# Patient Record
Sex: FEMALE | Race: WHITE | NOT HISPANIC OR LATINO | URBAN - METROPOLITAN AREA
[De-identification: names, ages, dates, MRNs, and addresses within clinical notes are randomized per-mention and may not be internally consistent; named-entity substitution may affect disease eponyms.]

---

## 2017-09-01 ENCOUNTER — EMERGENCY (EMERGENCY)
Facility: HOSPITAL | Age: 52
LOS: 1 days | Discharge: HOME | End: 2017-09-01
Attending: EMERGENCY MEDICINE
Payer: SELF-PAY

## 2017-09-01 VITALS
TEMPERATURE: 98 F | HEART RATE: 79 BPM | SYSTOLIC BLOOD PRESSURE: 129 MMHG | OXYGEN SATURATION: 100 % | RESPIRATION RATE: 20 BRPM | WEIGHT: 240.08 LBS | HEIGHT: 60 IN | DIASTOLIC BLOOD PRESSURE: 84 MMHG

## 2017-09-01 DIAGNOSIS — Z98.84 BARIATRIC SURGERY STATUS: Chronic | ICD-10-CM

## 2017-09-01 DIAGNOSIS — Z87.09 PERSONAL HISTORY OF OTHER DISEASES OF THE RESPIRATORY SYSTEM: Chronic | ICD-10-CM

## 2017-09-01 PROCEDURE — 99284 EMERGENCY DEPT VISIT MOD MDM: CPT

## 2017-09-01 PROCEDURE — 93971 EXTREMITY STUDY: CPT

## 2017-09-01 PROCEDURE — 93971 EXTREMITY STUDY: CPT | Mod: 26,LT

## 2017-09-01 PROCEDURE — 99284 EMERGENCY DEPT VISIT MOD MDM: CPT | Mod: 25

## 2017-09-01 NOTE — ED ADULT NURSE NOTE - OBJECTIVE STATEMENT
Patient arrived to ED today with c/o left lower leg pain behind her knee.  Patient states she has had the pain for the past month and has gotten worse since with pain radiating down her left leg and numbness to her foot at times.  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  Patient denies chest pain, SOB, fever, dizziness, headache, n/v.

## 2017-09-01 NOTE — ED STATDOCS - OBJECTIVE STATEMENT
51 y/o F pt with hx of HTN presents to ED c/o intermittent left calf  and inner thigh pain associated with numbness and tingling for  1 month. Over the past couple of days symptoms have worsened causing her to have difficulty walking. She states symptoms started when she stretched during sexual intercourse. No family hx of DVT or PE. No SOB. No further complaints at this time 51 y/o F pt with hx of HTN presents to ED c/o intermittent left calf  and inner thigh pain associated with numbness and tingling for 1 month. Over the past couple of days symptoms have worsened causing her to have difficulty walking. She states symptoms started when she stretched during sexual intercourse. No family hx of DVT or PE. No SOB. No further complaints at this time

## 2017-09-01 NOTE — ED STATDOCS - ATTENDING CONTRIBUTION TO CARE
I, Raymon Johnson, performed the initial face to face bedside interview with this patient regarding history of present illness, review of symptoms and relevant past medical, social and family history.  I completed an independent physical examination.  I was the provider who initially evaluated this patient.  The history, relevant review of systems, past medical and surgical history, medical decision making, and physical examination was documented by the scribe in my presence and I attest to the accuracy of the documentation. Follow-up on ordered tests (ie labs, radiologic studies) and re-evaluation of the patient's status has been communicated to the ACP.  Disposition of the patient will be based on test outcome and response to ED interventions.

## 2017-09-01 NOTE — ED STATDOCS - PROGRESS NOTE DETAILS
51 yo F PT complaining of L thigh/ calf pain/ tingling x 1 month. Seen by intake Physician, agree with H&P, orders, and plans. PT states she was having intercourse and experienced a sharp pain in her left distal inner thigh after her leg was abducted. PT admits to tingling of 2nd - 4th lower digits. PT denies fever, SOB, CP, HA, visual changes, LOC. PE: Heart: S1s2 rrr Lungs: CTA B/L Extremities: LLE tender to palpation of distal inner thigh + varicosities FROM Sensory grossly intact. A/P: r/o DVT, will follow pending ultrasound. 51 yo F PT complaining of L thigh/ calf pain/ tingling x 1 month. Seen by intake Physician, agree with H&P, orders, and plans. PT states she was having intercourse and experienced a sharp pain in her left distal inner thigh after her leg was abducted. PT admits to tingling of 2nd - 4th lower digits. PT denies fever, SOB, CP, HA, visual changes, LOC. PE: Heart: S1s2 rrr Lungs: CTA B/L Extremities: LLE tender to palpation of distal inner thigh + varicosities DP intact FROM Sensory grossly intact. A/P: r/o DVT, will follow pending ultrasound. ultrasound shows no signs of DVT in LLE.

## 2017-09-01 NOTE — ED ADULT NURSE NOTE - PSH
H/O tonsillitis  tonsillectomy  History of removal of laparoscopic gastric banding device  placed 2002 removed 2005

## 2017-11-04 ENCOUNTER — EMERGENCY (EMERGENCY)
Facility: HOSPITAL | Age: 52
LOS: 1 days | Discharge: HOME | End: 2017-11-04
Attending: EMERGENCY MEDICINE | Admitting: EMERGENCY MEDICINE
Payer: SELF-PAY

## 2017-11-04 VITALS
OXYGEN SATURATION: 99 % | TEMPERATURE: 99 F | RESPIRATION RATE: 18 BRPM | HEART RATE: 91 BPM | HEIGHT: 60 IN | SYSTOLIC BLOOD PRESSURE: 147 MMHG | WEIGHT: 222.01 LBS | DIASTOLIC BLOOD PRESSURE: 85 MMHG

## 2017-11-04 DIAGNOSIS — Z87.09 PERSONAL HISTORY OF OTHER DISEASES OF THE RESPIRATORY SYSTEM: Chronic | ICD-10-CM

## 2017-11-04 DIAGNOSIS — Z98.84 BARIATRIC SURGERY STATUS: Chronic | ICD-10-CM

## 2017-11-04 PROCEDURE — 12001 RPR S/N/AX/GEN/TRNK 2.5CM/<: CPT | Mod: RT

## 2017-11-04 PROCEDURE — 99283 EMERGENCY DEPT VISIT LOW MDM: CPT | Mod: 25

## 2017-11-04 PROCEDURE — 90715 TDAP VACCINE 7 YRS/> IM: CPT

## 2017-11-04 PROCEDURE — 90471 IMMUNIZATION ADMIN: CPT

## 2017-11-04 PROCEDURE — 12001 RPR S/N/AX/GEN/TRNK 2.5CM/<: CPT

## 2017-11-04 RX ORDER — TETANUS TOXOID, REDUCED DIPHTHERIA TOXOID AND ACELLULAR PERTUSSIS VACCINE, ADSORBED 5; 2.5; 8; 8; 2.5 [IU]/.5ML; [IU]/.5ML; UG/.5ML; UG/.5ML; UG/.5ML
0.5 SUSPENSION INTRAMUSCULAR ONCE
Qty: 0 | Refills: 0 | Status: COMPLETED | OUTPATIENT
Start: 2017-11-04 | End: 2017-11-04

## 2017-11-04 RX ADMIN — TETANUS TOXOID, REDUCED DIPHTHERIA TOXOID AND ACELLULAR PERTUSSIS VACCINE, ADSORBED 0.5 MILLILITER(S): 5; 2.5; 8; 8; 2.5 SUSPENSION INTRAMUSCULAR at 18:51

## 2017-11-04 NOTE — ED STATDOCS - OBJECTIVE STATEMENT
53 yo F, with hx of HTN, presents to ED c/o laceration to right webspace in between 4th and 5th digits while cooking today at 1200. Denies any other injuries. Last Td was 4 years ago. Denies blood thinners. Current meds include ASA 81mg, losartan, and HCTZ. No further complaints at this time.

## 2017-11-04 NOTE — ED STATDOCS - ATTENDING CONTRIBUTION TO CARE
I, Sonam Wright, performed the initial face to face bedside interview with this patient regarding history of present illness, review of symptoms and relevant past medical, social and family history.  I completed an independent physical examination.  I was the initial provider who evaluated this patient. I have signed out the follow up of any pending tests (i.e. labs, radiological studies) to the ACP.  I have communicated the patient’s plan of care and disposition with the ACP.

## 2018-06-29 ENCOUNTER — INPATIENT (INPATIENT)
Facility: HOSPITAL | Age: 53
LOS: 9 days | Discharge: HOME | End: 2018-07-09
Attending: PSYCHIATRY & NEUROLOGY | Admitting: PSYCHIATRY & NEUROLOGY
Payer: COMMERCIAL

## 2018-06-29 VITALS — TEMPERATURE: 99 F | RESPIRATION RATE: 19 BRPM

## 2018-06-29 DIAGNOSIS — Z87.09 PERSONAL HISTORY OF OTHER DISEASES OF THE RESPIRATORY SYSTEM: Chronic | ICD-10-CM

## 2018-06-29 DIAGNOSIS — Z98.84 BARIATRIC SURGERY STATUS: Chronic | ICD-10-CM

## 2018-06-29 DIAGNOSIS — F32.9 MAJOR DEPRESSIVE DISORDER, SINGLE EPISODE, UNSPECIFIED: ICD-10-CM

## 2018-06-29 PROCEDURE — 99223 1ST HOSP IP/OBS HIGH 75: CPT

## 2018-06-29 RX ORDER — IBUPROFEN 200 MG
400 TABLET ORAL THREE TIMES A DAY
Qty: 0 | Refills: 0 | Status: DISCONTINUED | OUTPATIENT
Start: 2018-06-29 | End: 2018-07-09

## 2018-06-29 RX ORDER — LOPERAMIDE HCL 2 MG
2 TABLET ORAL THREE TIMES A DAY
Qty: 0 | Refills: 0 | Status: DISCONTINUED | OUTPATIENT
Start: 2018-06-29 | End: 2018-07-09

## 2018-06-29 RX ORDER — TRAZODONE HCL 50 MG
50 TABLET ORAL AT BEDTIME
Qty: 0 | Refills: 0 | Status: DISCONTINUED | OUTPATIENT
Start: 2018-06-29 | End: 2018-07-03

## 2018-06-29 RX ORDER — VENLAFAXINE HCL 75 MG
37.5 CAPSULE, EXT RELEASE 24 HR ORAL DAILY
Qty: 0 | Refills: 0 | Status: DISCONTINUED | OUTPATIENT
Start: 2018-06-30 | End: 2018-07-01

## 2018-06-29 RX ORDER — PANTOPRAZOLE SODIUM 20 MG/1
40 TABLET, DELAYED RELEASE ORAL
Qty: 0 | Refills: 0 | Status: DISCONTINUED | OUTPATIENT
Start: 2018-06-29 | End: 2018-07-09

## 2018-06-29 RX ORDER — LOSARTAN POTASSIUM 100 MG/1
50 TABLET, FILM COATED ORAL DAILY
Qty: 0 | Refills: 0 | Status: DISCONTINUED | OUTPATIENT
Start: 2018-06-29 | End: 2018-07-09

## 2018-06-29 RX ORDER — ASPIRIN/CALCIUM CARB/MAGNESIUM 324 MG
81 TABLET ORAL
Qty: 0 | Refills: 0 | Status: DISCONTINUED | OUTPATIENT
Start: 2018-06-29 | End: 2018-07-09

## 2018-06-29 RX ORDER — FERROUS SULFATE 325(65) MG
325 TABLET ORAL DAILY
Qty: 0 | Refills: 0 | Status: DISCONTINUED | OUTPATIENT
Start: 2018-06-29 | End: 2018-07-09

## 2018-06-29 RX ADMIN — Medication 400 MILLIGRAM(S): at 23:07

## 2018-06-29 RX ADMIN — Medication 50 MILLIGRAM(S): at 23:07

## 2018-06-29 RX ADMIN — Medication 400 MILLIGRAM(S): at 23:30

## 2018-06-30 DIAGNOSIS — G89.29 OTHER CHRONIC PAIN: ICD-10-CM

## 2018-06-30 DIAGNOSIS — F32.9 MAJOR DEPRESSIVE DISORDER, SINGLE EPISODE, UNSPECIFIED: ICD-10-CM

## 2018-06-30 DIAGNOSIS — D64.9 ANEMIA, UNSPECIFIED: ICD-10-CM

## 2018-06-30 DIAGNOSIS — I10 ESSENTIAL (PRIMARY) HYPERTENSION: ICD-10-CM

## 2018-06-30 LAB
BASOPHILS # BLD AUTO: 0.01 K/UL — SIGNIFICANT CHANGE UP (ref 0–0.2)
BASOPHILS NFR BLD AUTO: 0.1 % — SIGNIFICANT CHANGE UP (ref 0–2)
EOSINOPHIL # BLD AUTO: 0.18 K/UL — SIGNIFICANT CHANGE UP (ref 0–0.5)
EOSINOPHIL NFR BLD AUTO: 2.6 % — SIGNIFICANT CHANGE UP (ref 0–6)
HCG SERPL-ACNC: < 5 MIU/ML — SIGNIFICANT CHANGE UP
HCT VFR BLD CALC: 35.7 % — SIGNIFICANT CHANGE UP (ref 34.5–45)
HGB BLD-MCNC: 11.2 G/DL — LOW (ref 11.5–15.5)
IMM GRANULOCYTES # BLD AUTO: 0.02 # — SIGNIFICANT CHANGE UP
IMM GRANULOCYTES NFR BLD AUTO: 0.3 % — SIGNIFICANT CHANGE UP (ref 0–1.5)
LYMPHOCYTES # BLD AUTO: 1.31 K/UL — SIGNIFICANT CHANGE UP (ref 1–3.3)
LYMPHOCYTES # BLD AUTO: 18.6 % — SIGNIFICANT CHANGE UP (ref 13–44)
MAGNESIUM SERPL-MCNC: 2 MG/DL — SIGNIFICANT CHANGE UP (ref 1.6–2.6)
MCHC RBC-ENTMCNC: 25.6 PG — LOW (ref 27–34)
MCHC RBC-ENTMCNC: 31.4 % — LOW (ref 32–36)
MCV RBC AUTO: 81.7 FL — SIGNIFICANT CHANGE UP (ref 80–100)
MONOCYTES # BLD AUTO: 0.48 K/UL — SIGNIFICANT CHANGE UP (ref 0–0.9)
MONOCYTES NFR BLD AUTO: 6.8 % — SIGNIFICANT CHANGE UP (ref 2–14)
NEUTROPHILS # BLD AUTO: 5.04 K/UL — SIGNIFICANT CHANGE UP (ref 1.8–7.4)
NEUTROPHILS NFR BLD AUTO: 71.6 % — SIGNIFICANT CHANGE UP (ref 43–77)
NRBC # FLD: 0 — SIGNIFICANT CHANGE UP
PLATELET # BLD AUTO: 289 K/UL — SIGNIFICANT CHANGE UP (ref 150–400)
PMV BLD: 10 FL — SIGNIFICANT CHANGE UP (ref 7–13)
RBC # BLD: 4.37 M/UL — SIGNIFICANT CHANGE UP (ref 3.8–5.2)
RBC # FLD: 17.5 % — HIGH (ref 10.3–14.5)
TSH SERPL-MCNC: 1.37 UIU/ML — SIGNIFICANT CHANGE UP (ref 0.27–4.2)
WBC # BLD: 7.04 K/UL — SIGNIFICANT CHANGE UP (ref 3.8–10.5)
WBC # FLD AUTO: 7.04 K/UL — SIGNIFICANT CHANGE UP (ref 3.8–10.5)

## 2018-06-30 PROCEDURE — 99232 SBSQ HOSP IP/OBS MODERATE 35: CPT

## 2018-06-30 PROCEDURE — 99223 1ST HOSP IP/OBS HIGH 75: CPT

## 2018-06-30 RX ORDER — SACCHAROMYCES BOULARDII 250 MG
250 POWDER IN PACKET (EA) ORAL
Qty: 0 | Refills: 0 | Status: DISCONTINUED | OUTPATIENT
Start: 2018-06-30 | End: 2018-07-09

## 2018-06-30 RX ADMIN — LOSARTAN POTASSIUM 50 MILLIGRAM(S): 100 TABLET, FILM COATED ORAL at 08:44

## 2018-06-30 RX ADMIN — Medication 325 MILLIGRAM(S): at 08:44

## 2018-06-30 RX ADMIN — Medication 81 MILLIGRAM(S): at 20:44

## 2018-06-30 RX ADMIN — Medication 400 MILLIGRAM(S): at 23:30

## 2018-06-30 RX ADMIN — PANTOPRAZOLE SODIUM 40 MILLIGRAM(S): 20 TABLET, DELAYED RELEASE ORAL at 08:05

## 2018-06-30 RX ADMIN — Medication 81 MILLIGRAM(S): at 08:44

## 2018-06-30 RX ADMIN — Medication 400 MILLIGRAM(S): at 09:20

## 2018-06-30 RX ADMIN — Medication 400 MILLIGRAM(S): at 22:00

## 2018-06-30 RX ADMIN — Medication 250 MILLIGRAM(S): at 20:44

## 2018-06-30 RX ADMIN — Medication 50 MILLIGRAM(S): at 22:00

## 2018-06-30 RX ADMIN — Medication 37.5 MILLIGRAM(S): at 08:44

## 2018-06-30 RX ADMIN — Medication 400 MILLIGRAM(S): at 08:50

## 2018-06-30 NOTE — CONSULT NOTE ADULT - ASSESSMENT
52F hx of HTN, Chronic Leg/Back pain, and Depression admitted to [facility] for suicidal ideation. Medicine consulted for co-management.

## 2018-06-30 NOTE — CONSULT NOTE ADULT - SUBJECTIVE AND OBJECTIVE BOX
HPI: 52F hx of HTN, anemia, Depression, admitted to [facility] for suicidal ideation. Medicine consulted for co-management. Patient seen at bedside, she feels her mood has improved now, still with chronic back and leg pain issues. No fevers, chills, chest pains, cough, dysuria, nausea, vomiting, diarrhea. She says she has been under a lot of pressure lately due to her parents being ill, her children, and financial issues. Because of this she self-discontinued her depression medications.     PAST MEDICAL & SURGICAL HISTORY:  HTN (hypertension)  ADHD  Low calcium levels  Anemia  H/O tonsillitis: tonsillectomy  History of removal of laparoscopic gastric banding device: placed 2002 removed 2005      Review of Systems:   CONSTITUTIONAL: No fever, weight loss, or fatigue  EYES: No eye pain, visual disturbances, or discharge  ENMT:  No difficulty hearing, tinnitus, vertigo; No sinus or throat pain  NECK: No pain or stiffness  RESPIRATORY: No cough, wheezing, chills or hemoptysis; No shortness of breath  CARDIOVASCULAR: No chest pain, palpitations, dizziness, or leg swelling  GASTROINTESTINAL: No abdominal or epigastric pain. No nausea, vomiting, or hematemesis; No diarrhea or constipation. No melena or hematochezia.  GENITOURINARY: No dysuria, frequency, hematuria, or incontinence  NEUROLOGICAL: No headaches, memory loss, loss of strength, numbness, or tremors  SKIN: No itching, burning, rashes, or lesions   LYMPH NODES: No enlarged glands  ENDOCRINE: No heat or cold intolerance; No hair loss  MUSCULOSKELETAL: See HPI  HEME/LYMPH: No easy bruising, or bleeding gums  ALLERY AND IMMUNOLOGIC: No hives or eczema    Allergies    Demerol HCl (Hives)    Intolerances    ACE inhibitors (Other)      Social History: drinks 2 glasses of alcohol per night, occasional marijuana use, no cigarettes, works as an ambulatory RN in [place]     FAMILY HISTORY: Depression, Suicide       MEDICATIONS  (STANDING):  aspirin enteric coated 81 milliGRAM(s) Oral two times a day  ferrous    sulfate 325 milliGRAM(s) Oral daily  losartan 50 milliGRAM(s) Oral daily  pantoprazole    Tablet 40 milliGRAM(s) Oral before breakfast  venlafaxine XR 37.5 milliGRAM(s) Oral daily    MEDICATIONS  (PRN):  ibuprofen  Tablet 400 milliGRAM(s) Oral three times a day PRN mild to moderate pain  loperamide 2 milliGRAM(s) Oral three times a day PRN Diarrhea  LORazepam     Tablet 0.5 milliGRAM(s) Oral two times a day PRN Anxiety  traZODone 50 milliGRAM(s) Oral at bedtime PRN insomnia      Vital Signs Last 24 Hrs  T(C): 36.8 (30 Jun 2018 07:45), Max: 37.4 (29 Jun 2018 22:05)  T(F): 98.2 (30 Jun 2018 07:45), Max: 99.3 (29 Jun 2018 22:05)  HR: --  BP: 130/91   BP(mean): --  RR: 18 (30 Jun 2018 07:45) (18 - 19)  SpO2: --  CAPILLARY BLOOD GLUCOSE    PHYSICAL EXAM:  GENERAL: NAD, well-developed  HEAD:  Atraumatic, Normocephalic  EYES: EOMI, PERRLA, conjunctiva and sclera clear  NECK: Supple, No JVD  CHEST/LUNG: Clear to auscultation bilaterally; No wheeze  HEART: Regular rate and rhythm; No murmurs, rubs, or gallops  ABDOMEN: Soft, Nontender, Nondistended; Bowel sounds present  EXTREMITIES:  2+ Peripheral Pulses, No clubbing, cyanosis, or edema  PSYCH: AAOx3  NEUROLOGY: non-focal  SKIN: No rashes or lesions    LABS:                        11.2   7.04  )-----------( 289      ( 30 Jun 2018 09:50 )             35.7       Mg     2.0     06-30

## 2018-06-30 NOTE — CONSULT NOTE ADULT - PROBLEM SELECTOR RECOMMENDATION 9
c/w Losartan as previously prescribed. Chart review shows that patient was also on Hctz 12.5 mg PO qD. May add if BP persistently elevated

## 2018-06-30 NOTE — CONSULT NOTE ADULT - PROBLEM SELECTOR RECOMMENDATION 3
Patient requesting Tylenol for moderate pain. Sometimes will take ibuprofen or percocet when her chronic pains which seem like muscular strains in origin given the focality on the calves thighs and lumbar but not necessarily related to each other. C/w tylenol for moderate pain and percocet 1 tab q6h for severe pain

## 2018-07-01 PROCEDURE — 99232 SBSQ HOSP IP/OBS MODERATE 35: CPT

## 2018-07-01 RX ORDER — HYDROCHLOROTHIAZIDE 25 MG
12.5 TABLET ORAL DAILY
Qty: 0 | Refills: 0 | Status: DISCONTINUED | OUTPATIENT
Start: 2018-07-01 | End: 2018-07-09

## 2018-07-01 RX ORDER — VENLAFAXINE HCL 75 MG
75 CAPSULE, EXT RELEASE 24 HR ORAL DAILY
Qty: 0 | Refills: 0 | Status: DISCONTINUED | OUTPATIENT
Start: 2018-07-01 | End: 2018-07-04

## 2018-07-01 RX ADMIN — Medication 250 MILLIGRAM(S): at 08:47

## 2018-07-01 RX ADMIN — Medication 12.5 MILLIGRAM(S): at 15:09

## 2018-07-01 RX ADMIN — Medication 81 MILLIGRAM(S): at 21:24

## 2018-07-01 RX ADMIN — PANTOPRAZOLE SODIUM 40 MILLIGRAM(S): 20 TABLET, DELAYED RELEASE ORAL at 08:47

## 2018-07-01 RX ADMIN — Medication 325 MILLIGRAM(S): at 08:47

## 2018-07-01 RX ADMIN — Medication 250 MILLIGRAM(S): at 21:24

## 2018-07-01 RX ADMIN — Medication 81 MILLIGRAM(S): at 08:47

## 2018-07-01 RX ADMIN — Medication 50 MILLIGRAM(S): at 22:45

## 2018-07-01 RX ADMIN — LOSARTAN POTASSIUM 50 MILLIGRAM(S): 100 TABLET, FILM COATED ORAL at 08:47

## 2018-07-01 RX ADMIN — Medication 37.5 MILLIGRAM(S): at 08:47

## 2018-07-01 RX ADMIN — Medication 400 MILLIGRAM(S): at 22:20

## 2018-07-01 RX ADMIN — Medication 400 MILLIGRAM(S): at 21:20

## 2018-07-02 RX ADMIN — Medication 81 MILLIGRAM(S): at 21:05

## 2018-07-02 RX ADMIN — Medication 250 MILLIGRAM(S): at 21:05

## 2018-07-02 RX ADMIN — Medication 250 MILLIGRAM(S): at 09:37

## 2018-07-02 RX ADMIN — Medication 12.5 MILLIGRAM(S): at 09:37

## 2018-07-02 RX ADMIN — PANTOPRAZOLE SODIUM 40 MILLIGRAM(S): 20 TABLET, DELAYED RELEASE ORAL at 09:55

## 2018-07-02 RX ADMIN — Medication 75 MILLIGRAM(S): at 09:55

## 2018-07-02 RX ADMIN — Medication 325 MILLIGRAM(S): at 09:37

## 2018-07-02 RX ADMIN — LOSARTAN POTASSIUM 50 MILLIGRAM(S): 100 TABLET, FILM COATED ORAL at 09:37

## 2018-07-02 RX ADMIN — Medication 400 MILLIGRAM(S): at 09:55

## 2018-07-02 RX ADMIN — Medication 81 MILLIGRAM(S): at 09:37

## 2018-07-03 RX ORDER — TRAZODONE HCL 50 MG
100 TABLET ORAL AT BEDTIME
Qty: 0 | Refills: 0 | Status: DISCONTINUED | OUTPATIENT
Start: 2018-07-03 | End: 2018-07-03

## 2018-07-03 RX ORDER — TRAZODONE HCL 50 MG
50 TABLET ORAL ONCE
Qty: 0 | Refills: 0 | Status: COMPLETED | OUTPATIENT
Start: 2018-07-03 | End: 2018-07-04

## 2018-07-03 RX ORDER — TRAZODONE HCL 50 MG
50 TABLET ORAL AT BEDTIME
Qty: 0 | Refills: 0 | Status: DISCONTINUED | OUTPATIENT
Start: 2018-07-03 | End: 2018-07-06

## 2018-07-03 RX ADMIN — Medication 75 MILLIGRAM(S): at 09:19

## 2018-07-03 RX ADMIN — Medication 50 MILLIGRAM(S): at 20:53

## 2018-07-03 RX ADMIN — LOSARTAN POTASSIUM 50 MILLIGRAM(S): 100 TABLET, FILM COATED ORAL at 09:19

## 2018-07-03 RX ADMIN — Medication 325 MILLIGRAM(S): at 09:19

## 2018-07-03 RX ADMIN — Medication 81 MILLIGRAM(S): at 20:53

## 2018-07-03 RX ADMIN — Medication 250 MILLIGRAM(S): at 20:53

## 2018-07-03 RX ADMIN — Medication 12.5 MILLIGRAM(S): at 09:19

## 2018-07-03 RX ADMIN — Medication 400 MILLIGRAM(S): at 20:54

## 2018-07-03 RX ADMIN — PANTOPRAZOLE SODIUM 40 MILLIGRAM(S): 20 TABLET, DELAYED RELEASE ORAL at 09:19

## 2018-07-03 RX ADMIN — Medication 400 MILLIGRAM(S): at 21:54

## 2018-07-03 RX ADMIN — Medication 250 MILLIGRAM(S): at 09:19

## 2018-07-03 RX ADMIN — Medication 81 MILLIGRAM(S): at 09:19

## 2018-07-04 PROCEDURE — 99232 SBSQ HOSP IP/OBS MODERATE 35: CPT

## 2018-07-04 RX ORDER — VENLAFAXINE HCL 75 MG
37.5 CAPSULE, EXT RELEASE 24 HR ORAL ONCE
Qty: 0 | Refills: 0 | Status: COMPLETED | OUTPATIENT
Start: 2018-07-04 | End: 2018-07-04

## 2018-07-04 RX ORDER — VENLAFAXINE HCL 75 MG
112.5 CAPSULE, EXT RELEASE 24 HR ORAL DAILY
Qty: 0 | Refills: 0 | Status: DISCONTINUED | OUTPATIENT
Start: 2018-07-04 | End: 2018-07-04

## 2018-07-04 RX ORDER — VENLAFAXINE HCL 75 MG
112.5 CAPSULE, EXT RELEASE 24 HR ORAL DAILY
Qty: 0 | Refills: 0 | Status: DISCONTINUED | OUTPATIENT
Start: 2018-07-05 | End: 2018-07-06

## 2018-07-04 RX ADMIN — Medication 12.5 MILLIGRAM(S): at 08:34

## 2018-07-04 RX ADMIN — Medication 81 MILLIGRAM(S): at 08:34

## 2018-07-04 RX ADMIN — LOSARTAN POTASSIUM 50 MILLIGRAM(S): 100 TABLET, FILM COATED ORAL at 08:34

## 2018-07-04 RX ADMIN — Medication 250 MILLIGRAM(S): at 08:34

## 2018-07-04 RX ADMIN — Medication 325 MILLIGRAM(S): at 08:34

## 2018-07-04 RX ADMIN — Medication 81 MILLIGRAM(S): at 20:56

## 2018-07-04 RX ADMIN — Medication 37.5 MILLIGRAM(S): at 08:34

## 2018-07-04 RX ADMIN — Medication 250 MILLIGRAM(S): at 20:56

## 2018-07-04 RX ADMIN — Medication 75 MILLIGRAM(S): at 08:14

## 2018-07-04 RX ADMIN — Medication 50 MILLIGRAM(S): at 22:38

## 2018-07-04 RX ADMIN — Medication 50 MILLIGRAM(S): at 20:56

## 2018-07-04 RX ADMIN — PANTOPRAZOLE SODIUM 40 MILLIGRAM(S): 20 TABLET, DELAYED RELEASE ORAL at 08:34

## 2018-07-05 PROCEDURE — 99232 SBSQ HOSP IP/OBS MODERATE 35: CPT

## 2018-07-05 RX ADMIN — Medication 81 MILLIGRAM(S): at 20:42

## 2018-07-05 RX ADMIN — Medication 400 MILLIGRAM(S): at 21:03

## 2018-07-05 RX ADMIN — Medication 81 MILLIGRAM(S): at 08:55

## 2018-07-05 RX ADMIN — Medication 112.5 MILLIGRAM(S): at 08:55

## 2018-07-05 RX ADMIN — LOSARTAN POTASSIUM 50 MILLIGRAM(S): 100 TABLET, FILM COATED ORAL at 08:55

## 2018-07-05 RX ADMIN — Medication 50 MILLIGRAM(S): at 20:42

## 2018-07-05 RX ADMIN — Medication 250 MILLIGRAM(S): at 20:42

## 2018-07-05 RX ADMIN — Medication 250 MILLIGRAM(S): at 08:55

## 2018-07-05 RX ADMIN — Medication 400 MILLIGRAM(S): at 20:00

## 2018-07-05 RX ADMIN — Medication 12.5 MILLIGRAM(S): at 08:55

## 2018-07-05 RX ADMIN — Medication 325 MILLIGRAM(S): at 08:55

## 2018-07-05 RX ADMIN — PANTOPRAZOLE SODIUM 40 MILLIGRAM(S): 20 TABLET, DELAYED RELEASE ORAL at 08:55

## 2018-07-06 LAB
BUN SERPL-MCNC: 13 MG/DL — SIGNIFICANT CHANGE UP (ref 7–23)
CALCIUM SERPL-MCNC: 9.4 MG/DL — SIGNIFICANT CHANGE UP (ref 8.4–10.5)
CHLORIDE SERPL-SCNC: 100 MMOL/L — SIGNIFICANT CHANGE UP (ref 98–107)
CO2 SERPL-SCNC: 28 MMOL/L — SIGNIFICANT CHANGE UP (ref 22–31)
CREAT SERPL-MCNC: 0.91 MG/DL — SIGNIFICANT CHANGE UP (ref 0.5–1.3)
GLUCOSE SERPL-MCNC: 93 MG/DL — SIGNIFICANT CHANGE UP (ref 70–99)
POTASSIUM SERPL-MCNC: 4.4 MMOL/L — SIGNIFICANT CHANGE UP (ref 3.5–5.3)
POTASSIUM SERPL-SCNC: 4.4 MMOL/L — SIGNIFICANT CHANGE UP (ref 3.5–5.3)
SODIUM SERPL-SCNC: 139 MMOL/L — SIGNIFICANT CHANGE UP (ref 135–145)

## 2018-07-06 PROCEDURE — 90853 GROUP PSYCHOTHERAPY: CPT

## 2018-07-06 PROCEDURE — 99232 SBSQ HOSP IP/OBS MODERATE 35: CPT | Mod: 25

## 2018-07-06 RX ORDER — IBUPROFEN 200 MG
1 TABLET ORAL
Qty: 0 | Refills: 0 | COMMUNITY

## 2018-07-06 RX ORDER — TRAZODONE HCL 50 MG
50 TABLET ORAL AT BEDTIME
Qty: 0 | Refills: 0 | Status: DISCONTINUED | OUTPATIENT
Start: 2018-07-06 | End: 2018-07-09

## 2018-07-06 RX ORDER — TRAZODONE HCL 50 MG
1 TABLET ORAL
Qty: 14 | Refills: 0 | OUTPATIENT
Start: 2018-07-06 | End: 2018-07-19

## 2018-07-06 RX ORDER — ASPIRIN/CALCIUM CARB/MAGNESIUM 324 MG
1 TABLET ORAL
Qty: 0 | Refills: 0 | COMMUNITY

## 2018-07-06 RX ORDER — LOSARTAN POTASSIUM 100 MG/1
1 TABLET, FILM COATED ORAL
Qty: 14 | Refills: 0 | OUTPATIENT
Start: 2018-07-06 | End: 2018-07-19

## 2018-07-06 RX ORDER — SACCHAROMYCES BOULARDII 250 MG
1 POWDER IN PACKET (EA) ORAL
Qty: 0 | Refills: 0 | COMMUNITY
Start: 2018-07-06

## 2018-07-06 RX ORDER — FERROUS SULFATE 325(65) MG
0 TABLET ORAL
Qty: 0 | Refills: 0 | COMMUNITY

## 2018-07-06 RX ORDER — LOPERAMIDE HCL 2 MG
1 TABLET ORAL
Qty: 0 | Refills: 0 | COMMUNITY
Start: 2018-07-06

## 2018-07-06 RX ORDER — PANTOPRAZOLE SODIUM 20 MG/1
1 TABLET, DELAYED RELEASE ORAL
Qty: 14 | Refills: 0 | OUTPATIENT
Start: 2018-07-06 | End: 2018-07-19

## 2018-07-06 RX ORDER — DEXTROAMPHETAMINE SACCHARATE, AMPHETAMINE ASPARTATE, DEXTROAMPHETAMINE SULFATE AND AMPHETAMINE SULFATE 1.875; 1.875; 1.875; 1.875 MG/1; MG/1; MG/1; MG/1
0 TABLET ORAL
Qty: 0 | Refills: 0 | COMMUNITY

## 2018-07-06 RX ORDER — VENLAFAXINE HCL 75 MG
150 CAPSULE, EXT RELEASE 24 HR ORAL DAILY
Qty: 0 | Refills: 0 | Status: DISCONTINUED | OUTPATIENT
Start: 2018-07-07 | End: 2018-07-09

## 2018-07-06 RX ORDER — VENLAFAXINE HCL 75 MG
1 CAPSULE, EXT RELEASE 24 HR ORAL
Qty: 14 | Refills: 0 | OUTPATIENT
Start: 2018-07-06 | End: 2018-07-19

## 2018-07-06 RX ADMIN — Medication 325 MILLIGRAM(S): at 09:17

## 2018-07-06 RX ADMIN — Medication 81 MILLIGRAM(S): at 09:17

## 2018-07-06 RX ADMIN — Medication 250 MILLIGRAM(S): at 09:17

## 2018-07-06 RX ADMIN — Medication 400 MILLIGRAM(S): at 04:58

## 2018-07-06 RX ADMIN — Medication 81 MILLIGRAM(S): at 21:27

## 2018-07-06 RX ADMIN — Medication 400 MILLIGRAM(S): at 03:50

## 2018-07-06 RX ADMIN — Medication 250 MILLIGRAM(S): at 21:27

## 2018-07-06 RX ADMIN — Medication 112.5 MILLIGRAM(S): at 09:17

## 2018-07-06 RX ADMIN — Medication 50 MILLIGRAM(S): at 21:27

## 2018-07-06 RX ADMIN — Medication 12.5 MILLIGRAM(S): at 09:17

## 2018-07-06 RX ADMIN — LOSARTAN POTASSIUM 50 MILLIGRAM(S): 100 TABLET, FILM COATED ORAL at 09:17

## 2018-07-07 RX ADMIN — Medication 400 MILLIGRAM(S): at 08:40

## 2018-07-07 RX ADMIN — Medication 250 MILLIGRAM(S): at 09:17

## 2018-07-07 RX ADMIN — Medication 50 MILLIGRAM(S): at 20:56

## 2018-07-07 RX ADMIN — Medication 250 MILLIGRAM(S): at 20:56

## 2018-07-07 RX ADMIN — LOSARTAN POTASSIUM 50 MILLIGRAM(S): 100 TABLET, FILM COATED ORAL at 09:16

## 2018-07-07 RX ADMIN — Medication 81 MILLIGRAM(S): at 20:56

## 2018-07-07 RX ADMIN — Medication 150 MILLIGRAM(S): at 09:17

## 2018-07-07 RX ADMIN — Medication 81 MILLIGRAM(S): at 09:16

## 2018-07-07 RX ADMIN — Medication 400 MILLIGRAM(S): at 09:40

## 2018-07-07 RX ADMIN — PANTOPRAZOLE SODIUM 40 MILLIGRAM(S): 20 TABLET, DELAYED RELEASE ORAL at 09:17

## 2018-07-07 RX ADMIN — Medication 12.5 MILLIGRAM(S): at 09:16

## 2018-07-07 RX ADMIN — Medication 325 MILLIGRAM(S): at 09:16

## 2018-07-08 RX ORDER — DOCUSATE SODIUM 100 MG
100 CAPSULE ORAL THREE TIMES A DAY
Qty: 0 | Refills: 0 | Status: DISCONTINUED | OUTPATIENT
Start: 2018-07-08 | End: 2018-07-09

## 2018-07-08 RX ADMIN — Medication 150 MILLIGRAM(S): at 08:26

## 2018-07-08 RX ADMIN — Medication 250 MILLIGRAM(S): at 21:02

## 2018-07-08 RX ADMIN — Medication 400 MILLIGRAM(S): at 09:00

## 2018-07-08 RX ADMIN — Medication 325 MILLIGRAM(S): at 08:26

## 2018-07-08 RX ADMIN — Medication 100 MILLIGRAM(S): at 16:12

## 2018-07-08 RX ADMIN — Medication 12.5 MILLIGRAM(S): at 08:26

## 2018-07-08 RX ADMIN — LOSARTAN POTASSIUM 50 MILLIGRAM(S): 100 TABLET, FILM COATED ORAL at 08:26

## 2018-07-08 RX ADMIN — Medication 81 MILLIGRAM(S): at 21:02

## 2018-07-08 RX ADMIN — Medication 81 MILLIGRAM(S): at 08:26

## 2018-07-08 RX ADMIN — Medication 400 MILLIGRAM(S): at 09:30

## 2018-07-08 RX ADMIN — Medication 400 MILLIGRAM(S): at 22:05

## 2018-07-08 RX ADMIN — Medication 250 MILLIGRAM(S): at 08:26

## 2018-07-08 RX ADMIN — Medication 400 MILLIGRAM(S): at 21:03

## 2018-07-08 RX ADMIN — PANTOPRAZOLE SODIUM 40 MILLIGRAM(S): 20 TABLET, DELAYED RELEASE ORAL at 08:23

## 2018-07-08 RX ADMIN — Medication 50 MILLIGRAM(S): at 21:02

## 2018-07-09 VITALS — TEMPERATURE: 99 F | RESPIRATION RATE: 18 BRPM

## 2018-07-09 RX ADMIN — Medication 81 MILLIGRAM(S): at 09:29

## 2018-07-09 RX ADMIN — LOSARTAN POTASSIUM 50 MILLIGRAM(S): 100 TABLET, FILM COATED ORAL at 09:29

## 2018-07-09 RX ADMIN — Medication 400 MILLIGRAM(S): at 08:19

## 2018-07-09 RX ADMIN — Medication 250 MILLIGRAM(S): at 09:29

## 2018-07-09 RX ADMIN — Medication 400 MILLIGRAM(S): at 08:40

## 2018-07-09 RX ADMIN — Medication 325 MILLIGRAM(S): at 09:29

## 2018-07-09 RX ADMIN — Medication 12.5 MILLIGRAM(S): at 09:29

## 2018-07-09 RX ADMIN — Medication 150 MILLIGRAM(S): at 09:29

## 2018-07-09 RX ADMIN — PANTOPRAZOLE SODIUM 40 MILLIGRAM(S): 20 TABLET, DELAYED RELEASE ORAL at 09:25

## 2021-06-17 PROBLEM — F90.9 ATTENTION-DEFICIT HYPERACTIVITY DISORDER, UNSPECIFIED TYPE: Chronic | Status: ACTIVE | Noted: 2017-09-01

## 2021-06-17 PROBLEM — I10 ESSENTIAL (PRIMARY) HYPERTENSION: Chronic | Status: ACTIVE | Noted: 2017-09-01

## 2021-06-17 PROBLEM — D64.9 ANEMIA, UNSPECIFIED: Chronic | Status: ACTIVE | Noted: 2017-09-01

## 2021-06-17 PROBLEM — E83.51 HYPOCALCEMIA: Chronic | Status: ACTIVE | Noted: 2017-09-01

## 2021-07-26 ENCOUNTER — APPOINTMENT (OUTPATIENT)
Dept: RHEUMATOLOGY | Facility: CLINIC | Age: 56
End: 2021-07-26

## 2021-07-27 PROBLEM — Z00.00 ENCOUNTER FOR PREVENTIVE HEALTH EXAMINATION: Status: ACTIVE | Noted: 2021-07-27

## 2021-07-28 ENCOUNTER — RESULT REVIEW (OUTPATIENT)
Age: 56
End: 2021-07-28

## 2021-08-09 ENCOUNTER — APPOINTMENT (OUTPATIENT)
Dept: RHEUMATOLOGY | Facility: CLINIC | Age: 56
End: 2021-08-09

## 2021-08-27 ENCOUNTER — APPOINTMENT (OUTPATIENT)
Dept: CARDIOLOGY | Facility: CLINIC | Age: 56
End: 2021-08-27

## 2021-08-31 ENCOUNTER — APPOINTMENT (OUTPATIENT)
Dept: RHEUMATOLOGY | Facility: CLINIC | Age: 56
End: 2021-08-31

## 2021-09-08 ENCOUNTER — APPOINTMENT (OUTPATIENT)
Dept: CARDIOLOGY | Facility: CLINIC | Age: 56
End: 2021-09-08

## 2021-09-14 ENCOUNTER — APPOINTMENT (OUTPATIENT)
Dept: RHEUMATOLOGY | Facility: CLINIC | Age: 56
End: 2021-09-14

## 2021-10-13 ENCOUNTER — RESULT REVIEW (OUTPATIENT)
Age: 56
End: 2021-10-13

## 2021-10-22 NOTE — ED ADULT TRIAGE NOTE - BP NONINVASIVE SYSTOLIC (MM HG)
----- Message from Red Pena 129

## 2022-08-04 ENCOUNTER — RESULT REVIEW (OUTPATIENT)
Age: 57
End: 2022-08-04

## 2022-11-01 ENCOUNTER — APPOINTMENT (OUTPATIENT)
Dept: OPHTHALMOLOGY | Facility: CLINIC | Age: 57
End: 2022-11-01

## 2022-12-20 ENCOUNTER — APPOINTMENT (OUTPATIENT)
Dept: OPHTHALMOLOGY | Facility: CLINIC | Age: 57
End: 2022-12-20

## 2025-06-23 ENCOUNTER — NON-APPOINTMENT (OUTPATIENT)
Age: 60
End: 2025-06-23

## 2025-07-28 NOTE — ED ADULT NURSE NOTE - RN DISCHARGE SIGNATURE
Reports history of. Referral to ENT. Interested in surgery.  Orders:    SERVICE TO ENT   01-Sep-2017